# Patient Record
Sex: FEMALE | Race: WHITE | Employment: OTHER | URBAN - METROPOLITAN AREA
[De-identification: names, ages, dates, MRNs, and addresses within clinical notes are randomized per-mention and may not be internally consistent; named-entity substitution may affect disease eponyms.]

---

## 2017-11-28 ENCOUNTER — HOSPITAL ENCOUNTER (OUTPATIENT)
Age: 67
Discharge: HOME | End: 2017-11-28

## 2020-04-30 ENCOUNTER — HOSPITAL ENCOUNTER (OUTPATIENT)
Dept: GENERAL RADIOLOGY | Age: 70
Discharge: HOME | End: 2020-05-02
Payer: MEDICARE

## 2020-04-30 ENCOUNTER — OFFICE VISIT (OUTPATIENT)
Dept: PRIMARY CARE CLINIC | Age: 70
End: 2020-04-30
Payer: MEDICARE

## 2020-04-30 VITALS
HEIGHT: 66 IN | TEMPERATURE: 97.1 F | WEIGHT: 132 LBS | DIASTOLIC BLOOD PRESSURE: 70 MMHG | BODY MASS INDEX: 21.21 KG/M2 | SYSTOLIC BLOOD PRESSURE: 124 MMHG | OXYGEN SATURATION: 99 % | HEART RATE: 76 BPM | RESPIRATION RATE: 16 BRPM

## 2020-04-30 PROCEDURE — 99212 OFFICE O/P EST SF 10 MIN: CPT | Performed by: PHYSICIAN ASSISTANT

## 2020-04-30 PROCEDURE — 72100 X-RAY EXAM L-S SPINE 2/3 VWS: CPT

## 2020-04-30 PROCEDURE — 99202 OFFICE O/P NEW SF 15 MIN: CPT | Performed by: PHYSICIAN ASSISTANT

## 2020-04-30 RX ORDER — PREDNISONE 20 MG/1
TABLET ORAL
Qty: 18 TABLET | Refills: 0 | Status: SHIPPED | OUTPATIENT
Start: 2020-04-30

## 2020-04-30 RX ORDER — CYCLOBENZAPRINE HCL 5 MG
5 TABLET ORAL 3 TIMES DAILY PRN
Qty: 20 TABLET | Refills: 0 | Status: SHIPPED | OUTPATIENT
Start: 2020-04-30 | End: 2020-05-10

## 2020-04-30 SDOH — HEALTH STABILITY: MENTAL HEALTH: HOW OFTEN DO YOU HAVE A DRINK CONTAINING ALCOHOL?: NEVER

## 2020-04-30 ASSESSMENT — ENCOUNTER SYMPTOMS
RESPIRATORY NEGATIVE: 1
BACK PAIN: 1
GASTROINTESTINAL NEGATIVE: 1

## 2020-04-30 NOTE — PROGRESS NOTES
[facility]    Subjective:      Patient ID: Tammy Barrera is a 77 y.o. y.o. female. Patient is seen due to left sciatica it started 8 days ago while gardening pulling on [employer]. That night could not sleep. Did not sleep well at all did finally see chiropractor the adjustment was very painful. Did not sleep at all last night. Chiropractor mentioned may need MRI. Sees Dr. Derrell Duran in [place] he is not in office today. Taking motrin 3 tabs every 8 hours. This is not helping. Can not bend even to put socks on it is very painful. Even going to the bathroom has pain shooting down left leg. Has history spinal stenosis. If stands even 5 minutes will have numbness in the left leg. This has been an ongoing thing. This pain now is much worse. Can not get comfortable. Tried heat and ice minimal help. Hot shower helps. The heat pad helps too. Limping nl due to left knee but no more than usual.       No past medical history on file. No past surgical history on file. No family history on file. No Known Allergies    Current Outpatient Medications   Medication Sig Dispense Refill    Glucosamine-Chondroit-Vit C-Mn (GLUCOSAMINE 1500 COMPLEX PO) Take by mouth      Multiple Vitamins-Minerals (MULTIVITAMIN ADULT PO) Take by mouth daily      predniSONE (DELTASONE) 20 MG tablet One tab tid for 3 days then one tab bid for 3 days then qd for 3 days 18 tablet 0    cyclobenzaprine (FLEXERIL) 5 MG tablet Take 1 tablet by mouth 3 times daily as needed for Muscle spasms 20 tablet 0     No current facility-administered medications for this visit. Review of Systems   Constitutional: Positive for activity change. HENT: Negative. Respiratory: Negative. Gastrointestinal: Negative. Genitourinary: Negative. Musculoskeletal: Positive for back pain. Sitting here is ok not as bad. It is worse moving and laying down. Neurological: Positive for numbness. Negative for weakness. times daily as needed for Muscle spasms  Dispense: 20 tablet;  Refill: 0    She will be notified of xray findings  If not improving or worsens will get MRI  Heat ice whatever helps  Fall prevention  Answered her questions      Nitin Avila  4/30/2020 10:56 AM EDT    (Pleasenote that portions of this note were completed with a voice recognition program.Efforts were made to edit the dictations but occasionally words are mis-transcribed.)

## 2021-04-26 LAB
HPV MRNA E6/E7: NOT DETECTED
MICROSCOPIC OBSERVATION: NORMAL